# Patient Record
(demographics unavailable — no encounter records)

---

## 2025-02-04 NOTE — CONSULT LETTER
[Dear  ___] : Dear  [unfilled], [Consult Letter:] : I had the pleasure of evaluating your patient, [unfilled]. [Please see my note below.] : Please see my note below. [Consult Closing:] : Thank you very much for allowing me to participate in the care of this patient.  If you have any questions, please do not hesitate to contact me. [Sincerely,] : Sincerely, [FreeTextEntry3] : Jay Louis, III, MD

## 2025-02-04 NOTE — HISTORY OF PRESENT ILLNESS
[de-identified] : The patient comes in today with complaints referable to his left shoulder.  He was playing racketball and developed pain to the left shoulder.  The patient states the onset/injury occurred 01/21/2025.  This injury is not work related or due to an automobile accident.  The patient states the pain is intermittent.  The patient describes the pain as sharp, achy and stabbing. [6] : a current pain level of 6/10 [de-identified] : lifting arm above head [de-identified] : ice, rest

## 2025-02-04 NOTE — DISCUSSION/SUMMARY
[de-identified] : The patient presents with calcific tendinitis with possible SLAP injury of the left shoulder.  At this time, I recommend rest, ice, topical anti-inflammatory and physical therapy.  He will be reassessed in three weeks, where I will discuss the potential for injection.  Of note, he is diabetic so he needs to get clearance from his primary care doctor.

## 2025-02-04 NOTE — PHYSICAL EXAM
[Normal] : Gait: normal [de-identified] : Right Shoulder: Shoulder: Range of Motion in Degrees:                                 Claimant:          Normal:    Abduction (Active)   180   180 degrees    Abduction (Passive)   180   180 degrees    Forward elevation (Active):   180   180 degrees    Forward elevation (Passive):  180   180 degrees    External rotation (Active):   45   45 degrees    External rotation (Passive):   45   45 degrees    Internal rotation (Active):   L-1   L-1    Internal rotation (Passive):   L-1   L-1      No motor weakness to internal rotation, external rotation or abduction in the scapular plane.  Negative crank test.  Negative Limestone's test.  Negative Speeds test. Negative Yergason's test.  Negative cross arm test.  No tenderness to palpation at the AC joint. Negative Layne sign.  Negative Neer's sign.  Negative apprehension. Negative sulcus sign.  No gross neurological or vascular deficits distally.  Skin is intact.  No rashes, scars or lesions.  2+ radial and ulnar pulses.  No extra-articular swelling or tenderness.   Left Shoulder: Shoulder: Range of Motion in Degrees:                                    Claimant: Normal: Abduction (Active)                   180                180 degrees Abduction (Passive)   180                180 degrees Forward elevation (Active):   180                180 degrees Forward elevation (Passive):   180                180 degrees External rotation (Active):    45                45 degrees External rotation (Passive):    45                45 degrees Internal rotation (Active):    L-1                L-1 Internal rotation (Passive):    L-1                L-1   No motor weakness to internal rotation, external rotation or abduction in the scapular plane.  Positive crank test.  Positive Limestone's test.  Negative Speeds test. Negative Yergason's test.  Negative cross arm test.  No tenderness to palpation at the AC joint. Positive Layne sign.  Positive Neer's sign. Negative apprehension. Negative sulcus sign.  No gross neurological or vascular deficits distally.  Skin is intact.  No rashes, scars or lesions. 2+ radial and ulnar pulses. No extra-articular swelling or tenderness.  [de-identified] : Station:  Normal. [de-identified] : Appearance:  Well-developed, well-nourished male in no acute distress.   [de-identified] : Review of radiographs show calcific tendinitis.

## 2025-02-04 NOTE — ADDENDUM
[FreeTextEntry1] : This note was written by Sherry Shrestha on 02/04/2025 acting as scribe for Jay Louis III, MD